# Patient Record
Sex: MALE | Race: WHITE | Employment: OTHER | URBAN - METROPOLITAN AREA
[De-identification: names, ages, dates, MRNs, and addresses within clinical notes are randomized per-mention and may not be internally consistent; named-entity substitution may affect disease eponyms.]

---

## 2020-09-18 NOTE — TELEPHONE ENCOUNTER
DIAGNOSIS: Bilateral Hip Replacement consult records with [facility]   APPOINTMENT DATE: 10/28/2020   NOTES STATUS DETAILS   OFFICE NOTE from referring provider N/A    OFFICE NOTE from other specialist Care Everywhere [facility]:  7/28/20 - ORTHO OV with Dr. Calderon  10/7/19 - PT OV with Rober Molina PT    [facility] [place]:  9/13/18, 8/7/18 - PCC OV with Dr. Pascal  8/26/16 - PCC OV with Dr. Proctor   DISCHARGE SUMMARY from hospital N/A    DISCHARGE REPORT from the ER N/A    OPERATIVE REPORT N/A    MEDICATION LIST Care Everywhere    EMG (for Spine) N/A    IMPLANT RECORD/STICKER N/A    LABS     CBC/DIFF N/A    CULTURES N/A    INJECTIONS DONE IN RADIOLOGY N/A    MRI In process [facility]:  8/16/18 - MRI Hip Left  8/16/18 - MRI Hip Right   CT SCAN N/A    XRAYS (IMAGES & REPORTS) In process [facility]:  7/28/20 - XR Pelvis Bilateral  8/26/16 - XR Pelvis with Hip   TUMOR     PATHOLOGY  Slides & report N/A      Records Requested  09/18/20    [facility] Fax: [phone]  [facility] Fax:[phone]   Outcome * 9/18/20 9:36 AM Faxed req to  and [facility] for images noted above to be pushed to [employer] [facility] - [place]   10/26/20   9:44 AM   Imaging in PACS  Complete  Carol Quiles CMA

## 2020-10-28 ENCOUNTER — PRE VISIT (OUTPATIENT)
Dept: ORTHOPEDICS | Facility: CLINIC | Age: 60
End: 2020-10-28

## 2020-10-28 ENCOUNTER — OFFICE VISIT (OUTPATIENT)
Dept: ORTHOPEDICS | Facility: CLINIC | Age: 60
End: 2020-10-28
Payer: COMMERCIAL

## 2020-10-28 DIAGNOSIS — M16.0 BILATERAL PRIMARY OSTEOARTHRITIS OF HIP: Primary | ICD-10-CM

## 2020-10-28 PROCEDURE — 99203 OFFICE O/P NEW LOW 30 MIN: CPT | Mod: GC | Performed by: ORTHOPAEDIC SURGERY

## 2020-10-28 RX ORDER — SIMVASTATIN 20 MG
TABLET ORAL
COMMUNITY
Start: 2020-08-25

## 2020-10-28 RX ORDER — LISINOPRIL 10 MG/1
TABLET ORAL
COMMUNITY
Start: 2020-08-25

## 2020-10-28 NOTE — PROGRESS NOTES
[facility], Orthopaedic Surgery, Arthritis, Hip and Knee Replacement    Erick Lock MRN# [id number]   Age: 60 year old YOB: 1960     Requesting physician: No ref. provider found              History of Present Illness:   Erick Lock is a 60 year old year old male who was employed for 30 years a  who presents today for evaluation and management of bilateral hip pain.  Patient reports that this pain has been worse for the past 4 years.  He had a softball accident approximately 4 years ago resulting in bilateral groin strains.  He was very slow to recover from this and has continued to have groin pain.  Recently an MRI was obtained and he was told that he had significant arthritis.  X-rays were obtained leading to referral to an orthopedic surgeon for discussion regarding arthroplasty.    Patient reports that he enjoys sports and would desire to return she has many of these as possible.  It has hip pain and lack of range of motion has been limiting him from doing this.  He has never had cortisone injections.  He takes anti-inflammatories intermittently.  He has not pursued physical therapy.   his desire for this appointment is to be scheduled for total hip arthroplasty.  He is uncertain as to whether to pursue a right or left hip replacement at this time.    Additionally, patient has spent significant amount of time independently researching implants and hip surgeries available.  He is very interested in pursuing a hip resurfacing due to his perceived benefit of an easier revision.    Background history:  DX:  1. Bilateral hip arthritis    TREATMENTS:  1. Aerobic exercise             Past Medical History:   Hypertension, hyperlipidemia    Prior history of blood clot: none  Prior history of bleeding problems: none  Prior history of anesthetic complications: none  Currently on opioids:  none  History of Diabetes (if so, recent A1c): no         Past Surgical  History:   No previous surgeries.  [place] teeth removed without complications anesthesia, bleeding or clotting         Social History:     He is a retired .  Smoking: None  Living situation: Lives in [place] with family           Family History:       Family history of blood clot: none  Family history of bleeding problems: none  Family history of anesthetic complications: none         Medications:     Current Outpatient Medications   Medication Sig     lisinopril (ZESTRIL) 10 MG tablet      simvastatin (ZOCOR) 20 MG tablet      No current facility-administered medications for this visit.             Review of Systems:   A comprehensive 10 point review of systems (constitutional, ENT, cardiac, peripheral vascular, lymphatic, respiratory, GI, , Musculoskeletal, skin, Neurological) was performed and found to be negative except as described in this note.     Also see intake form completed by patient.         Physical Exam:     EXAMINATION pertinent findings:   VITAL SIGNS: There were no vitals taken for this visit.  There is no height or weight on file to calculate BMI.  GEN: AOx3, cooperative, no distress  RESP: non labored breathing   ABD: benign   SKIN: grossly normal   LYMPHATIC: grossly normal   NEURO: grossly normal   VASCULAR: satisfactory perfusion of all extremities  MUSCULOSKELETAL:     Focused examination of right lower extremity demonstrates flexion to 100 degrees with obligatory external rotation.  Patient has very significant restriction of range of motion with internal and external rotation with hip flexion.  Crepitation noted with internal and external rotation remainder of neurovascular examination unremarkable.    Focused examination of left lower extremity demonstrates flexion to 100 degrees.  Internal rotation to 20 degrees external rotation to 20 degrees and hip flexion.  Remainder of neurovascular examination unremarkable.           Data:   Imaging:     AP pelvis  and crosstable lateral of the left hip were obtained at outside facility and available for review today.  These demonstrated significant osteoarthritis of the hip with bone-on-bone arthritis.  Additionally significant osteophytes both superiorly and inferiorly on the acetabulum are appreciated.  Small cyst formation was seen.           Assessment and Plan:   Assessment:    60-year-old male previously employed as a  with end-stage osteoarthritis of bilateral hips who comes in for conversation today regarding treatment.    Patient is done extensive personal research into that reconstructive surgery and implant technology.  He receives that for his goals a hip resurfacing would be beneficial.  Dr. Dumont does not believe that the inherent risks of a hip resurfacing would provide him a significant benefit.  He recommends referral to Dr. Allen or Dr. Rodriguez for conversation regarding pursuing a hip resurfacing procedure.      Patient was seen and discussed with Fihs Rey MD who is in agreement the assessment and plan.    TARAH Malave MD  PGY-4, Orthopaedic Surgery    Attending MD (Dr. Fish Rey) :  I performed the history and physical exam of the patient and discussed the patient's management with the resident and patient. I reviewed the above note and agree with the documented findings and plan of care, which I communicated to the patient.        We had a long discussion regarding ongoing management of his bilateral hip arthritis.  We discussed the relative risks and benefits of hip resurfacing, dual mobility type construct, and standard ceramic on polyethylene articulation total hip replacement.  In my opinion, in his case the benefits of resurfacing do not outweigh the additional risks associated with this due to the metal on metal bearing as well as the possibility of femoral neck fracture.  The patient nonetheless is interested in pursuing this as an option.  I recommended a  consultation with my partners Dr. Allen and Dr. Rodriguez who do hip resurfacing.  We will help the patient get a visit with them and he will otherwise return to clinic on a p.r.n. basis if he has any further questions or concerns.  Fish Rey M.D.     Arthritis and Joint Replacement  Department of Orthopaedic Surgery, [facility]  [email or web address]  [phone] (pager)           Review of Systems:

## 2020-10-28 NOTE — LETTER
Date:October 30, 2020      Patient was self referred, no letter generated. Do not send.        [facility] Health Information

## 2020-10-28 NOTE — LETTER
10/28/2020         RE: Erick Lock  [address]  [place] [address]        Dear Colleague,    Thank you for referring your patient, Erick Lock, to the [facility] [place]. Please see a copy of my visit note below.    [facility], Orthopaedic Surgery, Arthritis, Hip and Knee Replacement    Erick Lock MRN# [id number]   Age: 60 year old YOB: 1960     Requesting physician: No ref. provider found              History of Present Illness:   Erick Lock is a 60 year old year old male who was employed for 30 years a  who presents today for evaluation and management of bilateral hip pain.  Patient reports that this pain has been worse for the past 4 years.  He had a softball accident approximately 4 years ago resulting in bilateral groin strains.  He was very slow to recover from this and has continued to have groin pain.  Recently an MRI was obtained and he was told that he had significant arthritis.  X-rays were obtained leading to referral to an orthopedic surgeon for discussion regarding arthroplasty.    Patient reports that he enjoys sports and would desire to return she has many of these as possible.  It has hip pain and lack of range of motion has been limiting him from doing this.  He has never had cortisone injections.  He takes anti-inflammatories intermittently.  He has not pursued physical therapy.   his desire for this appointment is to be scheduled for total hip arthroplasty.  He is uncertain as to whether to pursue a right or left hip replacement at this time.    Additionally, patient has spent significant amount of time independently researching implants and hip surgeries available.  He is very interested in pursuing a hip resurfacing due to his perceived benefit of an easier revision.    Background history:  DX:  1. Bilateral hip arthritis    TREATMENTS:  1. Aerobic exercise             Past  Medical History:   Hypertension, hyperlipidemia    Prior history of blood clot: none  Prior history of bleeding problems: none  Prior history of anesthetic complications: none  Currently on opioids:  none  History of Diabetes (if so, recent A1c): no         Past Surgical History:   No previous surgeries.  [place] teeth removed without complications anesthesia, bleeding or clotting         Social History:     He is a retired .  Smoking: None  Living situation: Lives in [place] with family           Family History:       Family history of blood clot: none  Family history of bleeding problems: none  Family history of anesthetic complications: none         Medications:     Current Outpatient Medications   Medication Sig     lisinopril (ZESTRIL) 10 MG tablet      simvastatin (ZOCOR) 20 MG tablet      No current facility-administered medications for this visit.             Review of Systems:   A comprehensive 10 point review of systems (constitutional, ENT, cardiac, peripheral vascular, lymphatic, respiratory, GI, , Musculoskeletal, skin, Neurological) was performed and found to be negative except as described in this note.     Also see intake form completed by patient.         Physical Exam:     EXAMINATION pertinent findings:   VITAL SIGNS: There were no vitals taken for this visit.  There is no height or weight on file to calculate BMI.  GEN: AOx3, cooperative, no distress  RESP: non labored breathing   ABD: benign   SKIN: grossly normal   LYMPHATIC: grossly normal   NEURO: grossly normal   VASCULAR: satisfactory perfusion of all extremities  MUSCULOSKELETAL:     Focused examination of right lower extremity demonstrates flexion to 100 degrees with obligatory external rotation.  Patient has very significant restriction of range of motion with internal and external rotation with hip flexion.  Crepitation noted with internal and external rotation remainder of neurovascular examination  unremarkable.    Focused examination of left lower extremity demonstrates flexion to 100 degrees.  Internal rotation to 20 degrees external rotation to 20 degrees and hip flexion.  Remainder of neurovascular examination unremarkable.           Data:   Imaging:     AP pelvis and crosstable lateral of the left hip were obtained at outside facility and available for review today.  These demonstrated significant osteoarthritis of the hip with bone-on-bone arthritis.  Additionally significant osteophytes both superiorly and inferiorly on the acetabulum are appreciated.  Small cyst formation was seen.           Assessment and Plan:   Assessment:    60-year-old male previously employed as a  with end-stage osteoarthritis of bilateral hips who comes in for conversation today regarding treatment.    Patient is done extensive personal research into that reconstructive surgery and implant technology.  He receives that for his goals a hip resurfacing would be beneficial.  Dr. Dumont does not believe that the inherent risks of a hip resurfacing would provide him a significant benefit.  He recommends referral to Dr. Allen or Dr. Rodriguez for conversation regarding pursuing a hip resurfacing procedure.      Patient was seen and discussed with Fish Rey MD who is in agreement the assessment and plan.    TARAH Malave MD  PGY-4, Orthopaedic Surgery    Attending MD (Dr. Fish Rey) :  I performed the history and physical exam of the patient and discussed the patient's management with the resident and patient. I reviewed the above note and agree with the documented findings and plan of care, which I communicated to the patient.        We had a long discussion regarding ongoing management of his bilateral hip arthritis.  We discussed the relative risks and benefits of hip resurfacing, dual mobility type construct, and standard ceramic on polyethylene articulation total hip replacement.  In my opinion,  in his case the benefits of resurfacing do not outweigh the additional risks associated with this due to the metal on metal bearing as well as the possibility of femoral neck fracture.  The patient nonetheless is interested in pursuing this as an option.  I recommended a consultation with my partners Dr. Allen and Dr. Rodriguez who do hip resurfacing.  We will help the patient get a visit with them and he will otherwise return to clinic on a p.r.n. basis if he has any further questions or concerns.  Fish Rey M.D.     Arthritis and Joint Replacement  Department of Orthopaedic Surgery, [facility]  [email or web address]  [phone] (pager)           Review of Systems:       Again, thank you for allowing me to participate in the care of your patient.        Sincerely,        Fish Rey MD

## 2020-11-02 NOTE — TELEPHONE ENCOUNTER
RECORDS RECEIVED FROM: discuss possibility of hip resurfacing. Dr. Rey referring   DATE RECEIVED: Dec 10, 2020   NOTES STATUS DETAILS   11/02/20   8:48 AM   See [facility] pre-visit  Carol Quiles CMA

## 2020-11-10 ENCOUNTER — DOCUMENTATION ONLY (OUTPATIENT)
Dept: CARE COORDINATION | Facility: CLINIC | Age: 60
End: 2020-11-10

## 2020-12-10 ENCOUNTER — PRE VISIT (OUTPATIENT)
Dept: ORTHOPEDICS | Facility: CLINIC | Age: 60
End: 2020-12-10

## 2020-12-17 ENCOUNTER — VIRTUAL VISIT (OUTPATIENT)
Dept: ORTHOPEDICS | Facility: CLINIC | Age: 60
End: 2020-12-17
Payer: COMMERCIAL

## 2020-12-17 DIAGNOSIS — M16.11 PRIMARY OSTEOARTHRITIS OF RIGHT HIP: Primary | ICD-10-CM

## 2020-12-17 PROCEDURE — 99213 OFFICE O/P EST LOW 20 MIN: CPT | Mod: GT | Performed by: ORTHOPAEDIC SURGERY

## 2020-12-17 ASSESSMENT — HOOS S4: HOW SEVERE IS YOUR HIP JOINT STIFFNESS AFTER FIRST WAKENING IN THE MORNING?: MODERATE

## 2020-12-17 ASSESSMENT — ACTIVITIES OF DAILY LIVING (ADL)
ADL_SUBSCALE_SCORE: 32.35
ADL_SUM: 46
ADL_MEAN: 2.7

## 2020-12-17 NOTE — PROGRESS NOTES
Assessment: This is a 60 year old with hip osteoarthritis associated with severe symptoms despite long-term non-operative management. We had a long discussion about the diagnosis and the treatment options. He has done a fair amount of research on implants and had questions about hip resurfacings, alternative bearing hip resurfacings, total hip, alternative bearing total hip. We went through all of these and answered all the patient's questions to the best of my ability. By templating his hip appears to be too small for the available resurfacing implants. This is likely but not necessarily the case for dual mobility implants and I would have this available for his case should he decie to go forward. We discussed open surgical management for dislocations in the dual mobility population. We spent twenty minutes discussing total hip arthroplasty.  We discussed the implants, the procedure, the risks and benefits, and the post-operative course.  We discussed blood clots, blood clots to the lungs, injury to blood vessels and nerves, dislocation, infection, and leg length difference.  All the patients questions were answered to the best of my ability.    Plan: total hip arthroplasty, right first, when the patient chooses to go forward     Chief Complaint: Consult (patient had a MRI a few years ago and was told it was bone on bone and is looking to discuss AUBREY. )    Physician:  Fish Rey    HPI: Erick Lock is a 60 year old male who presents today for evaluation of his hips    Symptom Profile  Location of symptoms:  Groin   Onset: insidious with an acute increase playing softball  Trend: getting worse  Duration of symptoms: > 2 years   Quality of symptoms: aching, sharp/stabbing  Severity: severe  Alleviate: activity modification   Exacerbating:  Activities   Previous Treatments: Previous treatments include activity modification, oral pain medication (OTC)    MEDICAL HISTORY: History reviewed. No pertinent  past medical history.    Medications:     Current Outpatient Medications:      lisinopril (ZESTRIL) 10 MG tablet, , Disp: , Rfl:      simvastatin (ZOCOR) 20 MG tablet, , Disp: , Rfl:     Allergies: Patient has no known allergies.    SURGICAL HISTORY: History reviewed. No pertinent surgical history.    FAMILY HISTORY: History reviewed. No pertinent family history.    SOCIAL HISTORY:   Social History     Tobacco Use     Smoking status: Never Smoker     Smokeless tobacco: Never Used   Substance Use Topics     Alcohol use: Not on file   retired y      REVIEW OF SYSTEMS:  The comprehensive review of systems from the intake form was reviewed with the patient.  No fever, weight change or fatigue. No dry eyes. No oral ulcers, sore throat or voice change. No palpitations, syncope, angina or edema.  No chest pain, excessive sleepiness, shortness of breath or hemoptysis.   No abdominal pain, nausea, vomiting, diarrhea or heartburn.  No skin rash. No focal weakness or numbness. No bleeding or lymphadenopathy. No rhinitis or hives.     Exam:  On physical examination the patient appears the stated age, is in no acute distress, affectThe is appropriate, and breathing is non-labored.  Vitals are documented in the EMR and have been reviewed:    There were no vitals taken for this visit.  Data Unavailable  There is no height or weight on file to calculate BMI.    X-rays:   Bilateral hip osteoarthritis with complete loss of joint space

## 2020-12-17 NOTE — LETTER
"    12/17/2020         RE: Erick Lock  [address] [facility] [address]        Dear Colleague,    Thank you for referring your patient, Erick Lock, to the [facility] [place]. Please see a copy of my visit note below.    Erick Lock is a 60 year old male who is being evaluated via a billable video visit.      The patient has been notified of following:     \"This video visit will be conducted via a call between you and your physician/provider. We have found that certain health care needs can be provided without the need for an in-person physical exam.  This service lets us provide the care you need with a video conversation.  If a prescription is necessary we can send it directly to your pharmacy.  If lab work is needed we can place an order for that and you can then stop by our lab to have the test done at a later time.    Video visits are billed at different rates depending on your insurance coverage.  Please reach out to your insurance provider with any questions.    If during the course of the call the physician/provider feels a video visit is not appropriate, you will not be charged for this service.\"    Patient has given verbal consent for Video visit? Yes  How would you like to obtain your AVS? MyChart  If you are dropped from the video visit, the video invite should be resent to: Text to cell phone: [phone]  Will anyone else be joining your video visit? No            Assessment: This is a 60 year old with hip osteoarthritis associated with severe symptoms despite long-term non-operative management. We had a long discussion about the diagnosis and the treatment options. He has done a fair amount of research on implants and had questions about hip resurfacings, alternative bearing hip resurfacings, total hip, alternative bearing total hip. We went through all of these and answered all the patient's questions to the best of my ability. By " templating his hip appears to be too small for the available resurfacing implants. This is likely but not necessarily the case for dual mobility implants and I would have this available for his case should he decie to go forward. We discussed open surgical management for dislocations in the dual mobility population. We spent twenty minutes discussing total hip arthroplasty.  We discussed the implants, the procedure, the risks and benefits, and the post-operative course.  We discussed blood clots, blood clots to the lungs, injury to blood vessels and nerves, dislocation, infection, and leg length difference.  All the patients questions were answered to the best of my ability.    Plan: total hip arthroplasty, right first, when the patient chooses to go forward     Chief Complaint: Consult (patient had a MRI a few years ago and was told it was bone on bone and is looking to discuss AUBREY. )    Physician:  Fish Rey    HPI: Erick Lock is a 60 year old male who presents today for evaluation of his hips    Symptom Profile  Location of symptoms:  Groin   Onset: insidious with an acute increase playing softball  Trend: getting worse  Duration of symptoms: > 2 years   Quality of symptoms: aching, sharp/stabbing  Severity: severe  Alleviate: activity modification   Exacerbating:  Activities   Previous Treatments: Previous treatments include activity modification, oral pain medication (OTC)    MEDICAL HISTORY: History reviewed. No pertinent past medical history.    Medications:     Current Outpatient Medications:      lisinopril (ZESTRIL) 10 MG tablet, , Disp: , Rfl:      simvastatin (ZOCOR) 20 MG tablet, , Disp: , Rfl:     Allergies: Patient has no known allergies.    SURGICAL HISTORY: History reviewed. No pertinent surgical history.    FAMILY HISTORY: History reviewed. No pertinent family history.    SOCIAL HISTORY:   Social History     Tobacco Use     Smoking status: Never Smoker     Smokeless tobacco:  Never Used   Substance Use Topics     Alcohol use: Not on file   retired y      REVIEW OF SYSTEMS:  The comprehensive review of systems from the intake form was reviewed with the patient.  No fever, weight change or fatigue. No dry eyes. No oral ulcers, sore throat or voice change. No palpitations, syncope, angina or edema.  No chest pain, excessive sleepiness, shortness of breath or hemoptysis.   No abdominal pain, nausea, vomiting, diarrhea or heartburn.  No skin rash. No focal weakness or numbness. No bleeding or lymphadenopathy. No rhinitis or hives.     Exam:  On physical examination the patient appears the stated age, is in no acute distress, affectThe is appropriate, and breathing is non-labored.  Vitals are documented in the EMR and have been reviewed:    There were no vitals taken for this visit.  Data Unavailable  There is no height or weight on file to calculate BMI.    X-rays:   Bilateral hip osteoarthritis with complete loss of joint space       Fish Rodriguez MD

## 2020-12-17 NOTE — PROGRESS NOTES
"Erick Lock is a 60 year old male who is being evaluated via a billable video visit.      The patient has been notified of following:     \"This video visit will be conducted via a call between you and your physician/provider. We have found that certain health care needs can be provided without the need for an in-person physical exam.  This service lets us provide the care you need with a video conversation.  If a prescription is necessary we can send it directly to your pharmacy.  If lab work is needed we can place an order for that and you can then stop by our lab to have the test done at a later time.    Video visits are billed at different rates depending on your insurance coverage.  Please reach out to your insurance provider with any questions.    If during the course of the call the physician/provider feels a video visit is not appropriate, you will not be charged for this service.\"    Patient has given verbal consent for Video visit? Yes  How would you like to obtain your AVS? MyChart  If you are dropped from the video visit, the video invite should be resent to: Text to cell phone: [phone]  Will anyone else be joining your video visit? No          "

## 2020-12-17 NOTE — NURSING NOTE
Reason For Visit:   Chief Complaint   Patient presents with     Consult     patient had a MRI a few years ago and was told it was bone on bone and is looking to discuss AUBREY.        There were no vitals taken for this visit.         Alfredo Sheth ATC

## 2020-12-18 ENCOUNTER — TELEPHONE (OUTPATIENT)
Dept: ORTHOPEDICS | Facility: CLINIC | Age: 60
End: 2020-12-18

## 2020-12-29 NOTE — TELEPHONE ENCOUNTER
Attempted to reach out to patient to discuss scheduling surgery with Dr. Rodriguez. Left best call back number of [phone]

## 2020-12-29 NOTE — TELEPHONE ENCOUNTER
Spoke with patient. Patient states he would like to cancel the hold that we have as a surgery date for him. Patient states that he is getting a second opinion elsewhere and will call if he wants to move forward with Dr. Rodriguez.

## 2025-08-18 SDOH — HEALTH STABILITY: PHYSICAL HEALTH: ON AVERAGE, HOW MANY MINUTES DO YOU ENGAGE IN EXERCISE AT THIS LEVEL?: 60 MIN

## 2025-08-18 SDOH — HEALTH STABILITY: PHYSICAL HEALTH: ON AVERAGE, HOW MANY DAYS PER WEEK DO YOU ENGAGE IN MODERATE TO STRENUOUS EXERCISE (LIKE A BRISK WALK)?: 7 DAYS

## 2025-08-18 ASSESSMENT — SOCIAL DETERMINANTS OF HEALTH (SDOH): HOW OFTEN DO YOU GET TOGETHER WITH FRIENDS OR RELATIVES?: ONCE A WEEK

## 2025-08-19 ENCOUNTER — ORDERS ONLY (AUTO-RELEASED) (OUTPATIENT)
Dept: FAMILY MEDICINE | Facility: CLINIC | Age: 65
End: 2025-08-19

## 2025-08-19 ENCOUNTER — OFFICE VISIT (OUTPATIENT)
Dept: FAMILY MEDICINE | Facility: CLINIC | Age: 65
End: 2025-08-19
Payer: COMMERCIAL

## 2025-08-19 VITALS
TEMPERATURE: 97.8 F | OXYGEN SATURATION: 95 % | DIASTOLIC BLOOD PRESSURE: 89 MMHG | HEART RATE: 79 BPM | BODY MASS INDEX: 31.8 KG/M2 | RESPIRATION RATE: 16 BRPM | SYSTOLIC BLOOD PRESSURE: 132 MMHG | WEIGHT: 209.8 LBS | HEIGHT: 68 IN

## 2025-08-19 DIAGNOSIS — Z13.29 SCREENING FOR ENDOCRINE, METABOLIC, AND IMMUNITY DISORDER: ICD-10-CM

## 2025-08-19 DIAGNOSIS — Z00.00 ROUTINE GENERAL MEDICAL EXAMINATION AT A HEALTH CARE FACILITY: Primary | ICD-10-CM

## 2025-08-19 DIAGNOSIS — Z12.5 SCREENING FOR PROSTATE CANCER: ICD-10-CM

## 2025-08-19 DIAGNOSIS — Z82.49 FAMILY HISTORY OF CORONARY ARTERY DISEASE: ICD-10-CM

## 2025-08-19 DIAGNOSIS — Z12.11 SCREEN FOR COLON CANCER: ICD-10-CM

## 2025-08-19 DIAGNOSIS — Z13.0 SCREENING FOR ENDOCRINE, METABOLIC, AND IMMUNITY DISORDER: ICD-10-CM

## 2025-08-19 DIAGNOSIS — M25.50 PAIN IN JOINT, MULTIPLE SITES: ICD-10-CM

## 2025-08-19 DIAGNOSIS — R73.01 IMPAIRED FASTING GLUCOSE: ICD-10-CM

## 2025-08-19 DIAGNOSIS — Z13.228 SCREENING FOR ENDOCRINE, METABOLIC, AND IMMUNITY DISORDER: ICD-10-CM

## 2025-08-19 DIAGNOSIS — E78.2 MIXED HYPERLIPIDEMIA: ICD-10-CM

## 2025-08-19 DIAGNOSIS — I10 ESSENTIAL HYPERTENSION: ICD-10-CM

## 2025-08-19 PROBLEM — Z96.643 HISTORY OF BILATERAL HIP REPLACEMENTS: Status: ACTIVE | Noted: 2024-09-10

## 2025-08-19 LAB
ALBUMIN SERPL BCG-MCNC: 4.2 G/DL (ref 3.5–5.2)
ALP SERPL-CCNC: 61 U/L (ref 40–150)
ALT SERPL W P-5'-P-CCNC: 40 U/L (ref 0–70)
ANION GAP SERPL CALCULATED.3IONS-SCNC: 10 MMOL/L (ref 7–15)
AST SERPL W P-5'-P-CCNC: 119 U/L (ref 0–45)
BILIRUB SERPL-MCNC: 0.8 MG/DL
BUN SERPL-MCNC: 17.6 MG/DL (ref 8–23)
CALCIUM SERPL-MCNC: 9.1 MG/DL (ref 8.8–10.4)
CHLORIDE SERPL-SCNC: 104 MMOL/L (ref 98–107)
CHOLEST SERPL-MCNC: 187 MG/DL
CREAT SERPL-MCNC: 0.95 MG/DL (ref 0.67–1.17)
EGFRCR SERPLBLD CKD-EPI 2021: 90 ML/MIN/1.73M2
ERYTHROCYTE [DISTWIDTH] IN BLOOD BY AUTOMATED COUNT: 11.9 % (ref 10–15)
EST. AVERAGE GLUCOSE BLD GHB EST-MCNC: 126 MG/DL
FASTING STATUS PATIENT QL REPORTED: YES
FASTING STATUS PATIENT QL REPORTED: YES
GLUCOSE SERPL-MCNC: 94 MG/DL (ref 70–99)
HBA1C MFR BLD: 6 % (ref 0–5.6)
HCO3 SERPL-SCNC: 25 MMOL/L (ref 22–29)
HCT VFR BLD AUTO: 48.3 % (ref 40–53)
HDLC SERPL-MCNC: 47 MG/DL
HGB BLD-MCNC: 16.6 G/DL (ref 13.3–17.7)
LDLC SERPL CALC-MCNC: 118 MG/DL
MCH RBC QN AUTO: 32 PG (ref 26.5–33)
MCHC RBC AUTO-ENTMCNC: 34.4 G/DL (ref 31.5–36.5)
MCV RBC AUTO: 93.2 FL (ref 78–100)
NONHDLC SERPL-MCNC: 140 MG/DL
PLATELET # BLD AUTO: 230 10E3/UL (ref 150–450)
POTASSIUM SERPL-SCNC: 4.1 MMOL/L (ref 3.4–5.3)
PROT SERPL-MCNC: 6.6 G/DL (ref 6.4–8.3)
PSA SERPL DL<=0.01 NG/ML-MCNC: 0.47 NG/ML (ref 0–4.5)
RBC # BLD AUTO: 5.18 10E6/UL (ref 4.4–5.9)
SODIUM SERPL-SCNC: 139 MMOL/L (ref 135–145)
TRIGL SERPL-MCNC: 112 MG/DL
WBC # BLD AUTO: 6.61 10E3/UL (ref 4–11)

## 2025-08-19 PROCEDURE — G0103 PSA SCREENING: HCPCS | Performed by: FAMILY MEDICINE

## 2025-08-19 PROCEDURE — 99386 PREV VISIT NEW AGE 40-64: CPT | Performed by: FAMILY MEDICINE

## 2025-08-19 PROCEDURE — 80061 LIPID PANEL: CPT | Performed by: FAMILY MEDICINE

## 2025-08-19 PROCEDURE — G2211 COMPLEX E/M VISIT ADD ON: HCPCS | Performed by: FAMILY MEDICINE

## 2025-08-19 PROCEDURE — 99214 OFFICE O/P EST MOD 30 MIN: CPT | Mod: 25 | Performed by: FAMILY MEDICINE

## 2025-08-19 PROCEDURE — 36415 COLL VENOUS BLD VENIPUNCTURE: CPT | Performed by: FAMILY MEDICINE

## 2025-08-19 PROCEDURE — 85027 COMPLETE CBC AUTOMATED: CPT | Performed by: FAMILY MEDICINE

## 2025-08-19 PROCEDURE — 83036 HEMOGLOBIN GLYCOSYLATED A1C: CPT | Performed by: FAMILY MEDICINE

## 2025-08-19 PROCEDURE — 80053 COMPREHEN METABOLIC PANEL: CPT | Performed by: FAMILY MEDICINE

## 2025-08-19 RX ORDER — SIMVASTATIN 20 MG
20 TABLET ORAL AT BEDTIME
Qty: 90 TABLET | Refills: 3 | Status: SHIPPED | OUTPATIENT
Start: 2025-08-19

## 2025-08-19 RX ORDER — LISINOPRIL 10 MG/1
5 TABLET ORAL DAILY
Qty: 45 TABLET | Refills: 3 | Status: SHIPPED | OUTPATIENT
Start: 2025-08-19

## 2025-08-26 ENCOUNTER — RESULTS FOLLOW-UP (OUTPATIENT)
Dept: FAMILY MEDICINE | Facility: CLINIC | Age: 65
End: 2025-08-26
Payer: COMMERCIAL

## 2025-08-26 DIAGNOSIS — Z82.49 FAMILY HISTORY OF CORONARY ARTERY DISEASE: ICD-10-CM

## 2025-08-26 DIAGNOSIS — R74.01 ELEVATED AST (SGOT): Primary | ICD-10-CM

## 2025-08-26 DIAGNOSIS — E78.2 MIXED HYPERLIPIDEMIA: ICD-10-CM

## 2025-08-26 RX ORDER — SIMVASTATIN 40 MG
40 TABLET ORAL AT BEDTIME
Qty: 90 TABLET | Refills: 3 | Status: SHIPPED | OUTPATIENT
Start: 2025-08-26

## 2025-09-02 ENCOUNTER — ALLIED HEALTH/NURSE VISIT (OUTPATIENT)
Dept: FAMILY MEDICINE | Facility: CLINIC | Age: 65
End: 2025-09-02
Payer: COMMERCIAL

## 2025-09-02 VITALS — HEART RATE: 84 BPM | SYSTOLIC BLOOD PRESSURE: 134 MMHG | DIASTOLIC BLOOD PRESSURE: 88 MMHG

## 2025-09-02 DIAGNOSIS — I10 ESSENTIAL HYPERTENSION: Primary | ICD-10-CM

## 2025-09-02 PROCEDURE — 99207 PR NO CHARGE NURSE ONLY: CPT
